# Patient Record
Sex: MALE | Race: WHITE | NOT HISPANIC OR LATINO | Employment: OTHER | URBAN - METROPOLITAN AREA
[De-identification: names, ages, dates, MRNs, and addresses within clinical notes are randomized per-mention and may not be internally consistent; named-entity substitution may affect disease eponyms.]

---

## 2018-08-16 ENCOUNTER — OFFICE VISIT (OUTPATIENT)
Dept: ENDOCRINOLOGY | Facility: MEDICAL CENTER | Age: 63
End: 2018-08-16
Payer: MEDICARE

## 2018-08-16 ENCOUNTER — HOSPITAL ENCOUNTER (OUTPATIENT)
Dept: LAB | Facility: MEDICAL CENTER | Age: 63
End: 2018-08-16
Attending: INTERNAL MEDICINE
Payer: MEDICARE

## 2018-08-16 VITALS
DIASTOLIC BLOOD PRESSURE: 74 MMHG | SYSTOLIC BLOOD PRESSURE: 112 MMHG | OXYGEN SATURATION: 92 % | HEART RATE: 72 BPM | HEIGHT: 72 IN | BODY MASS INDEX: 26.52 KG/M2 | WEIGHT: 195.8 LBS

## 2018-08-16 DIAGNOSIS — E04.2 MULTIPLE THYROID NODULES: ICD-10-CM

## 2018-08-16 LAB
T4 FREE SERPL-MCNC: 0.77 NG/DL (ref 0.53–1.43)
TSH SERPL DL<=0.005 MIU/L-ACNC: 2.17 UIU/ML (ref 0.38–5.33)

## 2018-08-16 PROCEDURE — 84443 ASSAY THYROID STIM HORMONE: CPT

## 2018-08-16 PROCEDURE — 36415 COLL VENOUS BLD VENIPUNCTURE: CPT

## 2018-08-16 PROCEDURE — 84439 ASSAY OF FREE THYROXINE: CPT

## 2018-08-16 PROCEDURE — 99204 OFFICE O/P NEW MOD 45 MIN: CPT | Performed by: INTERNAL MEDICINE

## 2018-08-16 RX ORDER — ATORVASTATIN CALCIUM 20 MG/1
TABLET, FILM COATED ORAL
COMMUNITY
Start: 2018-05-23

## 2018-08-16 NOTE — PROGRESS NOTES
New Patient Consult Note    Reason for consult: Thyroid nodules    HPI:  Lars Meraz is a 63 y.o. old patient who comes in today for evaluation of thyroid nodules.  He was initially found to have multiple thyroid nodules in 2013.  He had FNA of dominant right thyroid lobe nodules in 2013 which came back benign.  Thyroid ultrasound in 2016 showed essentially stable appearance of thyroid nodules compared to ultrasound from 2013, largest nodule is 4.0 cm in the right thyroid lobe.  No family history of thyroid cancer.  No history of neck radiation.  He has never been on any thyroid medications.  He denies any issues with palpitations or racing heart.  No difficulty swallowing or breathing.    ROS:  Constitutional: No unintentional weight loss  Cardiac: No palpitations or racing heart  Resp: No shortness of breath  All other systems were reviewed and were negative.    Past Medical History:  Patient Active Problem List    Diagnosis Date Noted   • Multiple thyroid nodules 08/16/2018       Past Surgical History:  History reviewed. No pertinent surgical history.    Allergies:  Patient has no known allergies.    Social History:  Social History     Social History   • Marital status:      Spouse name: N/A   • Number of children: N/A   • Years of education: N/A     Occupational History   • Not on file.     Social History Main Topics   • Smoking status: Never Smoker   • Smokeless tobacco: Never Used   • Alcohol use Yes      Comment: 1 beer every couple of months.    • Drug use: No   • Sexual activity: Not on file     Other Topics Concern   • Not on file     Social History Narrative   • No narrative on file       Family History:  History reviewed. No pertinent family history.    Medications:    Current Outpatient Prescriptions:   •  atorvastatin (LIPITOR) 20 MG Tab, , Disp: , Rfl:     Physical Examination:  Vital signs: /74   Pulse 72   Ht 1.829 m (6')   Wt 88.8 kg (195 lb 12.8 oz)   SpO2 92%   BMI 26.56  kg/m²   General: No apparent distress, cooperative  Eyes: No scleral icterus or discharge  ENMT: Normal on external inspection of nose, lips  Neck: No abnormal masses on inspection, +large right thyroid lobe nodule  Resp: Normal effort, clear to auscultation bilaterally   CVS: Regular rate and rhythm, S1 S2 normal, no murmur   Extremities: No edema  Neuro: Alert and oriented  Skin: No rash  Psych: Normal mood and affect    Assessment and Plan:    1. Multiple thyroid nodules  · We will repeat thyroid ultrasound now, last ultrasound was done at [facility] in 2016  · We will repeat TSH and free T4 now  · We discussed about pros and cons of observation with serial ultrasound imaging versus right thyroid lobectomy; he will think more about it  - TSH; Future  - FREE THYROXINE; Future  - US-SOFT TISSUES OF HEAD - NECK; Future    Return in about 1 year (around 8/16/2019).    Thank you for allowing me to participate in the care of this patient.    Lina Mckeon M.D.  08/16/18    This note was created using voice recognition software (Dragon). The accuracy of the dictation is limited by the abilities of the software. I have reviewed the note prior to signing, however some errors in grammar and context are still possible. If you have any questions related to this note please do not hesitate to contact our office.

## 2018-08-17 ENCOUNTER — TELEPHONE (OUTPATIENT)
Dept: ENDOCRINOLOGY | Facility: MEDICAL CENTER | Age: 63
End: 2018-08-17

## 2018-08-17 NOTE — TELEPHONE ENCOUNTER
----- Message from Lina Mckeon M.D. sent at 8/17/2018  6:41 AM PDT -----  Please inform patient that thyroid hormone levels are normal.  Also please make sure that we fax the requisition for thyroid ultrasound to [facility].

## 2018-08-30 ENCOUNTER — TELEPHONE (OUTPATIENT)
Dept: ENDOCRINOLOGY | Facility: MEDICAL CENTER | Age: 63
End: 2018-08-30

## 2018-08-30 NOTE — TELEPHONE ENCOUNTER
Please inform patient that thyroid ultrasound at [facility] on 8/28/2018 showed stable appearance of thyroid nodules compared to prior ultrasound from May 2016.